# Patient Record
Sex: FEMALE | Race: WHITE | Employment: UNEMPLOYED | URBAN - METROPOLITAN AREA
[De-identification: names, ages, dates, MRNs, and addresses within clinical notes are randomized per-mention and may not be internally consistent; named-entity substitution may affect disease eponyms.]

---

## 2017-03-02 ENCOUNTER — OFFICE VISIT (OUTPATIENT)
Dept: INTERNAL MEDICINE CLINIC | Facility: CLINIC | Age: 47
End: 2017-03-02

## 2017-03-02 VITALS
BODY MASS INDEX: 33.49 KG/M2 | SYSTOLIC BLOOD PRESSURE: 144 MMHG | HEIGHT: 68 IN | DIASTOLIC BLOOD PRESSURE: 88 MMHG | RESPIRATION RATE: 16 BRPM | HEART RATE: 80 BPM | WEIGHT: 221 LBS

## 2017-03-02 DIAGNOSIS — E66.9 OBESITY, CLASS II, BMI 35-39.9: ICD-10-CM

## 2017-03-02 DIAGNOSIS — Z51.81 ENCOUNTER FOR THERAPEUTIC DRUG MONITORING: Primary | ICD-10-CM

## 2017-03-02 DIAGNOSIS — R73.03 PREDIABETES: ICD-10-CM

## 2017-03-02 PROCEDURE — 99213 OFFICE O/P EST LOW 20 MIN: CPT | Performed by: NURSE PRACTITIONER

## 2017-03-02 RX ORDER — TOPIRAMATE 50 MG/1
50 TABLET, FILM COATED ORAL 2 TIMES DAILY
COMMUNITY
End: 2017-06-02

## 2017-03-02 RX ORDER — PHENTERMINE HYDROCHLORIDE 15 MG/1
15 CAPSULE ORAL EVERY MORNING
Qty: 30 CAPSULE | Refills: 2 | Status: SHIPPED | OUTPATIENT
Start: 2017-03-02 | End: 2017-05-31

## 2017-03-02 RX ORDER — TOPIRAMATE 50 MG/1
50 TABLET, FILM COATED ORAL 2 TIMES DAILY
Qty: 60 TABLET | Refills: 2 | Status: SHIPPED | OUTPATIENT
Start: 2017-03-02 | End: 2017-05-31

## 2017-03-02 RX ORDER — PHENTERMINE HYDROCHLORIDE 15 MG/1
15 CAPSULE ORAL EVERY MORNING
COMMUNITY
End: 2017-06-02

## 2017-03-02 NOTE — PROGRESS NOTES
CC: Patient presents with:  Weight Check: lost 1 pounds       HPI:   Obesity. Patient doing well on phentermine, topiramate, belviq and metformin with good appetite control.  She feels she has lost more inches than on scale as went shopping for MOWGLI fasciitis     Depressive disorder, not elsewhere classified     Anxiety state, unspecified     Pneumonia     Encounter for therapeutic drug monitoring     Obesity, Class II, BMI 35-39.9     Prediabetes        REVIEW OF SYSTEMS:   RESPIRATORY: ebenezer gutierrez patient indicates understanding of these issues and agrees to the plan. Return in about 3 months (around 6/2/2017).

## 2017-04-10 ENCOUNTER — PRIOR ORIGINAL RECORDS (OUTPATIENT)
Dept: OTHER | Age: 47
End: 2017-04-10

## 2017-05-11 ENCOUNTER — PRIOR ORIGINAL RECORDS (OUTPATIENT)
Dept: OTHER | Age: 47
End: 2017-05-11

## 2017-06-02 ENCOUNTER — OFFICE VISIT (OUTPATIENT)
Dept: INTERNAL MEDICINE CLINIC | Facility: CLINIC | Age: 47
End: 2017-06-02

## 2017-06-02 VITALS
HEART RATE: 88 BPM | RESPIRATION RATE: 16 BRPM | SYSTOLIC BLOOD PRESSURE: 120 MMHG | DIASTOLIC BLOOD PRESSURE: 80 MMHG | HEIGHT: 68 IN | BODY MASS INDEX: 33.04 KG/M2 | WEIGHT: 218 LBS

## 2017-06-02 DIAGNOSIS — E66.9 OBESITY, CLASS II, BMI 35-39.9: ICD-10-CM

## 2017-06-02 DIAGNOSIS — Z51.81 ENCOUNTER FOR THERAPEUTIC DRUG MONITORING: Primary | ICD-10-CM

## 2017-06-02 PROCEDURE — 99213 OFFICE O/P EST LOW 20 MIN: CPT | Performed by: NURSE PRACTITIONER

## 2017-06-02 RX ORDER — PHENTERMINE HYDROCHLORIDE 15 MG/1
15 CAPSULE ORAL EVERY MORNING
Qty: 30 CAPSULE | Refills: 0 | Status: SHIPPED | OUTPATIENT
Start: 2017-06-02 | End: 2017-08-29

## 2017-06-02 RX ORDER — TOPIRAMATE 50 MG/1
50 TABLET, FILM COATED ORAL 2 TIMES DAILY
Qty: 60 TABLET | Refills: 0 | Status: SHIPPED | OUTPATIENT
Start: 2017-06-02 | End: 2017-08-29

## 2017-06-02 NOTE — PROGRESS NOTES
CC: Patient presents with:  Weight Check: lost 3 pounds       HPI:   Obesity. Patient tolerating all wlc meds and is continuing to eat  healthy and exercising and feels better overall.        Current Outpatient Prescriptions:  Phentermine HCl 15 MG Or monitoring     Obesity, Class II, BMI 35-39.9     Prediabetes        REVIEW OF SYSTEMS:   RESPIRATORY: denies shortness of breath   CARDIOVASCULAR: denies chest pain    EXAM:   /80 mmHg  Pulse 88  Resp 16  Ht 68\"  Wt 218 lb  BMI 33.15 kg/m2  GENERAL

## 2017-06-02 NOTE — PATIENT INSTRUCTIONS
Weigh weekly and if increase of 5 lbs total make earlier appointment. More Tips for Healthy Eating Out     Balance out your calories. If you're going out for dinner at a nice restaurant, make healthier choices during the day.    Be creative when eating

## 2017-06-16 ENCOUNTER — TELEPHONE (OUTPATIENT)
Dept: INTERNAL MEDICINE CLINIC | Facility: CLINIC | Age: 47
End: 2017-06-16

## 2017-06-16 NOTE — TELEPHONE ENCOUNTER
Message rec'd   Told patient to look on paperwork or to verify with pharmacy. She stated that she was missing the 2nd and 3rd month script bc she is now on a 3 month plan.  Pt told to call us back if there is a problem   Closing encounter

## 2017-08-24 ENCOUNTER — PRIOR ORIGINAL RECORDS (OUTPATIENT)
Dept: OTHER | Age: 47
End: 2017-08-24

## 2017-08-29 ENCOUNTER — OFFICE VISIT (OUTPATIENT)
Dept: INTERNAL MEDICINE CLINIC | Facility: CLINIC | Age: 47
End: 2017-08-29

## 2017-08-29 VITALS
DIASTOLIC BLOOD PRESSURE: 80 MMHG | HEART RATE: 80 BPM | HEIGHT: 68 IN | BODY MASS INDEX: 32.28 KG/M2 | SYSTOLIC BLOOD PRESSURE: 120 MMHG | WEIGHT: 213 LBS | RESPIRATION RATE: 16 BRPM

## 2017-08-29 DIAGNOSIS — Z51.81 ENCOUNTER FOR THERAPEUTIC DRUG MONITORING: ICD-10-CM

## 2017-08-29 DIAGNOSIS — E66.9 OBESITY, CLASS II, BMI 35-39.9: ICD-10-CM

## 2017-08-29 PROCEDURE — 99213 OFFICE O/P EST LOW 20 MIN: CPT | Performed by: NURSE PRACTITIONER

## 2017-08-29 RX ORDER — TOPIRAMATE 50 MG/1
50 TABLET, FILM COATED ORAL 2 TIMES DAILY
Qty: 60 TABLET | Refills: 2 | Status: SHIPPED | OUTPATIENT
Start: 2017-08-29 | End: 2017-12-06

## 2017-08-29 RX ORDER — PHENTERMINE HYDROCHLORIDE 15 MG/1
15 CAPSULE ORAL EVERY MORNING
Qty: 30 CAPSULE | Refills: 2 | Status: SHIPPED | OUTPATIENT
Start: 2017-08-29 | End: 2017-12-06

## 2017-08-29 NOTE — PROGRESS NOTES
CC: Patient presents with:  Weight Check: lost 5 pounds       HPI:   Obesity. Patient tolerating all wlc meds and is continuing to eat  healthy and exercising and feels better overall.  She just had bilateral laser done on legs with Dr. Arlette Maravilla and is Depressive disorder, not elsewhere classified     Anxiety state, unspecified     Pneumonia     Encounter for therapeutic drug monitoring     Obesity, Class II, BMI 35-39.9     Prediabetes        REVIEW OF SYSTEMS:   RESPIRATORY: denies shortness of breath

## 2017-08-30 ENCOUNTER — PRIOR ORIGINAL RECORDS (OUTPATIENT)
Dept: OTHER | Age: 47
End: 2017-08-30

## 2017-09-07 ENCOUNTER — PRIOR ORIGINAL RECORDS (OUTPATIENT)
Dept: OTHER | Age: 47
End: 2017-09-07

## 2017-09-14 ENCOUNTER — PRIOR ORIGINAL RECORDS (OUTPATIENT)
Dept: OTHER | Age: 47
End: 2017-09-14

## 2017-11-14 ENCOUNTER — PRIOR ORIGINAL RECORDS (OUTPATIENT)
Dept: OTHER | Age: 47
End: 2017-11-14

## 2017-12-06 ENCOUNTER — OFFICE VISIT (OUTPATIENT)
Dept: INTERNAL MEDICINE CLINIC | Facility: CLINIC | Age: 47
End: 2017-12-06

## 2017-12-06 VITALS
DIASTOLIC BLOOD PRESSURE: 82 MMHG | WEIGHT: 207.81 LBS | SYSTOLIC BLOOD PRESSURE: 112 MMHG | BODY MASS INDEX: 32 KG/M2 | RESPIRATION RATE: 16 BRPM

## 2017-12-06 DIAGNOSIS — E66.9 OBESITY, CLASS II, BMI 35-39.9: ICD-10-CM

## 2017-12-06 DIAGNOSIS — Z51.81 ENCOUNTER FOR THERAPEUTIC DRUG MONITORING: ICD-10-CM

## 2017-12-06 PROCEDURE — 99213 OFFICE O/P EST LOW 20 MIN: CPT | Performed by: NURSE PRACTITIONER

## 2017-12-06 RX ORDER — PHENTERMINE HYDROCHLORIDE 15 MG/1
15 CAPSULE ORAL EVERY MORNING
Qty: 30 CAPSULE | Refills: 2 | Status: SHIPPED | OUTPATIENT
Start: 2017-12-06 | End: 2018-04-06 | Stop reason: DRUGHIGH

## 2017-12-06 RX ORDER — TOPIRAMATE 50 MG/1
50 TABLET, FILM COATED ORAL 2 TIMES DAILY
Qty: 60 TABLET | Refills: 2 | Status: SHIPPED | OUTPATIENT
Start: 2017-12-06 | End: 2018-04-06

## 2017-12-06 NOTE — PATIENT INSTRUCTIONS
Keep up the great work with healthy eating and exercise. Diabetes: Learning About Serving and Portion Sizes     A good rule of thumb: Devote half your plate to vegetables and green salad.  Split the other half between protein and starchy carbohydrates When you’re planning for a snack or a meal, keep servings in mind. If you don’t have measuring cups or a scale handy, there are ways to [facility] serving sizes, such as comparing your food to the size of your hand (see pictures above).   Managing portion si

## 2017-12-06 NOTE — PROGRESS NOTES
CC: Patient presents with:  Weight Check: 3 month f/u - down 6 lbs from last visit in August       HPI:   Obesity.  Patient tolerating all wlc meds and is continuing to eat  healthy and exercising more and still has dog walking service and works with Problem List:     Annual physical exam     Plantar fasciitis     Depressive disorder, not elsewhere classified     Anxiety state, unspecified     Pneumonia     Encounter for therapeutic drug monitoring     Obesity, Class II, BMI 35-39.9     Prediabetes agrees to the plan. Return in about 3 months (around 3/6/2018).

## 2018-02-20 ENCOUNTER — PRIOR ORIGINAL RECORDS (OUTPATIENT)
Dept: OTHER | Age: 48
End: 2018-02-20

## 2018-02-21 ENCOUNTER — PRIOR ORIGINAL RECORDS (OUTPATIENT)
Dept: OTHER | Age: 48
End: 2018-02-21

## 2018-03-07 ENCOUNTER — MYAURORA ACCOUNT LINK (OUTPATIENT)
Dept: OTHER | Age: 48
End: 2018-03-07

## 2018-10-03 PROCEDURE — 88175 CYTOPATH C/V AUTO FLUID REDO: CPT | Performed by: INTERNAL MEDICINE

## 2018-10-03 PROCEDURE — 87624 HPV HI-RISK TYP POOLED RSLT: CPT | Performed by: INTERNAL MEDICINE

## 2019-02-28 VITALS
HEART RATE: 92 BPM | RESPIRATION RATE: 16 BRPM | SYSTOLIC BLOOD PRESSURE: 138 MMHG | BODY MASS INDEX: 31.98 KG/M2 | HEIGHT: 68 IN | DIASTOLIC BLOOD PRESSURE: 80 MMHG | WEIGHT: 211 LBS

## 2019-03-01 VITALS
BODY MASS INDEX: 32.74 KG/M2 | HEART RATE: 84 BPM | WEIGHT: 216 LBS | HEIGHT: 68 IN | SYSTOLIC BLOOD PRESSURE: 140 MMHG | RESPIRATION RATE: 16 BRPM | DIASTOLIC BLOOD PRESSURE: 92 MMHG

## 2019-03-01 VITALS
WEIGHT: 221 LBS | DIASTOLIC BLOOD PRESSURE: 78 MMHG | HEIGHT: 68 IN | HEART RATE: 76 BPM | RESPIRATION RATE: 16 BRPM | BODY MASS INDEX: 33.49 KG/M2 | SYSTOLIC BLOOD PRESSURE: 126 MMHG

## 2020-02-10 ENCOUNTER — APPOINTMENT (OUTPATIENT)
Dept: CT IMAGING | Facility: HOSPITAL | Age: 50
End: 2020-02-10
Attending: EMERGENCY MEDICINE
Payer: COMMERCIAL

## 2020-02-10 ENCOUNTER — HOSPITAL ENCOUNTER (EMERGENCY)
Facility: HOSPITAL | Age: 50
Discharge: HOME | End: 2020-02-11
Attending: EMERGENCY MEDICINE
Payer: COMMERCIAL

## 2020-02-10 DIAGNOSIS — S32.009A CLOSED FRACTURE OF TRANSVERSE PROCESS OF LUMBAR VERTEBRA, INITIAL ENCOUNTER (HCC): Primary | ICD-10-CM

## 2020-02-10 PROCEDURE — 72131 CT LUMBAR SPINE W/O DYE: CPT | Performed by: EMERGENCY MEDICINE

## 2020-02-10 PROCEDURE — 72125 CT NECK SPINE W/O DYE: CPT | Performed by: EMERGENCY MEDICINE

## 2020-02-10 PROCEDURE — 99284 EMERGENCY DEPT VISIT MOD MDM: CPT

## 2020-02-10 PROCEDURE — 99285 EMERGENCY DEPT VISIT HI MDM: CPT

## 2020-02-10 RX ORDER — DEXTROAMPHETAMINE SACCHARATE, AMPHETAMINE ASPARTATE MONOHYDRATE, DEXTROAMPHETAMINE SULFATE AND AMPHETAMINE SULFATE 5; 5; 5; 5 MG/1; MG/1; MG/1; MG/1
20 CAPSULE, EXTENDED RELEASE ORAL EVERY MORNING
COMMUNITY

## 2020-02-11 ENCOUNTER — APPOINTMENT (OUTPATIENT)
Dept: GENERAL RADIOLOGY | Facility: HOSPITAL | Age: 50
End: 2020-02-11
Attending: EMERGENCY MEDICINE
Payer: COMMERCIAL

## 2020-02-11 VITALS
WEIGHT: 240 LBS | RESPIRATION RATE: 18 BRPM | HEART RATE: 77 BPM | SYSTOLIC BLOOD PRESSURE: 117 MMHG | DIASTOLIC BLOOD PRESSURE: 70 MMHG | OXYGEN SATURATION: 99 % | HEIGHT: 68 IN | TEMPERATURE: 98 F | BODY MASS INDEX: 36.37 KG/M2

## 2020-02-11 PROCEDURE — 72170 X-RAY EXAM OF PELVIS: CPT | Performed by: EMERGENCY MEDICINE

## 2020-02-11 RX ORDER — CYCLOBENZAPRINE HCL 10 MG
10 TABLET ORAL 3 TIMES DAILY PRN
Qty: 20 TABLET | Refills: 0 | Status: SHIPPED | OUTPATIENT
Start: 2020-02-11 | End: 2020-02-17

## 2020-02-11 RX ORDER — IBUPROFEN 600 MG/1
600 TABLET ORAL ONCE
Status: COMPLETED | OUTPATIENT
Start: 2020-02-11 | End: 2020-02-11

## 2020-02-11 NOTE — ED PROVIDER NOTES
Patient Seen in: [facility] Emergency Department      History   Patient presents with:  Trauma  Back Pain    Stated Complaint: thrown from horse tonight, back pain    HPI    Patient is a 51-year-old female comes in emergency room for evaluation of n apparent distress, nontoxic, well-appearing. HEENT: Normocephalic, atraumatic. Moist mucous membranes. Pupils equal round reactive to light accommodation, extraocular motion is intact, sclerae white, conjunctiva is pink.   Oropharynx is unremarkable, no patient, I determined, within reasonable clinical confidence and prior to discharge, that an emergency medical condition was not or was no longer present. There was no indication for further evaluation, treatment or admission on an emergency basis.   Compr

## 2020-02-11 NOTE — ED INITIAL ASSESSMENT (HPI)
Patient here with pain to head, neck, low back, left hip/groin after being thrown from a horse during riding lessons about 1800 tonight. Patient denies any LOC but states she did hit her head. Patient states she landed on a sand/dirt arena.   Patient timoshimon

## 2020-02-12 ENCOUNTER — TELEPHONE (OUTPATIENT)
Dept: SURGERY | Facility: CLINIC | Age: 50
End: 2020-02-12

## 2020-02-12 NOTE — TELEPHONE ENCOUNTER
NP, ref by THE [facility] ER 2/10/20, Fx Back-imaging done THE [facility]. Was referred to Dr Feliz Mar.  Please advise when & where to put fx back

## 2020-02-17 ENCOUNTER — OFFICE VISIT (OUTPATIENT)
Dept: SURGERY | Facility: CLINIC | Age: 50
End: 2020-02-17
Payer: COMMERCIAL

## 2020-02-17 VITALS
SYSTOLIC BLOOD PRESSURE: 120 MMHG | DIASTOLIC BLOOD PRESSURE: 80 MMHG | BODY MASS INDEX: 36.37 KG/M2 | HEART RATE: 70 BPM | HEIGHT: 68 IN | WEIGHT: 240 LBS

## 2020-02-17 DIAGNOSIS — S32.009D CLOSED FRACTURE OF TRANSVERSE PROCESS OF LUMBAR VERTEBRA WITH ROUTINE HEALING, SUBSEQUENT ENCOUNTER: Primary | ICD-10-CM

## 2020-02-17 PROCEDURE — 99204 OFFICE O/P NEW MOD 45 MIN: CPT | Performed by: PHYSICIAN ASSISTANT

## 2020-02-17 RX ORDER — CYCLOBENZAPRINE HCL 10 MG
10 TABLET ORAL 3 TIMES DAILY PRN
Qty: 90 TABLET | Refills: 1 | Status: SHIPPED | OUTPATIENT
Start: 2020-02-17 | End: 2021-03-10

## 2020-02-17 RX ORDER — PROPRANOLOL HYDROCHLORIDE 20 MG/1
1 TABLET ORAL DAILY
COMMUNITY
Start: 2019-12-18

## 2020-02-17 NOTE — PROGRESS NOTES
[facility] Neurosurgery Consultation      HISTORY OF PRESENT Lexi Barba is a 52year old right-handed female here in follow-up from the emergency room. She gives a history of riding horses at least once a week.   She wrote a more aggressive cou • ANXIETY    • DEPRESSION     psychiatrist Dr. Aimee Haji HISTORY:  Past Surgical History:   Procedure Laterality Date   •      • GASTRIC BYPASS,OBESE<100CM MAURICE-EN-Y     • OTHER      left fibula with plate   • TONSILLECTOMY pubis    Upper extremity strength:       Deltoid    Triceps     Biceps        Wrist Extension  Finger extension Thumb Abduction  Thumb adduction Intrinsics   Right         5        5         5          5 5 5 5 5 5-   Left         5        5         5

## 2020-02-17 NOTE — PATIENT INSTRUCTIONS
Refill policies:    • Allow 2-3 business days for refills; controlled substances may take longer.   • Contact your pharmacy at least 5 days prior to running out of medication and have them send an electronic request or submit request through the “request re Depending on your insurance carrier, approval may take 3-10 days. It is highly recommended patients contact their insurance carrier directly to determine coverage.   If test is done without insurance authorization, patient may be responsible for the entire in 8 weeks with lumbar x-rays

## 2020-02-26 ENCOUNTER — PATIENT MESSAGE (OUTPATIENT)
Dept: SURGERY | Facility: CLINIC | Age: 50
End: 2020-02-26

## 2020-02-26 NOTE — TELEPHONE ENCOUNTER
From: Maxim Pro  To: PHILIPP Schreiber  Sent: 2/26/2020 11:23 AM CST  Subject: Visit Follow-up Question    Good morning,  It was nice meeting you last week.  Thanks for helping me understand exactly what is going on with my body in terms of my

## 2020-04-07 ENCOUNTER — PATIENT MESSAGE (OUTPATIENT)
Dept: SURGERY | Facility: CLINIC | Age: 50
End: 2020-04-07

## 2020-04-07 ENCOUNTER — TELEPHONE (OUTPATIENT)
Dept: SURGERY | Facility: CLINIC | Age: 50
End: 2020-04-07

## 2020-04-08 NOTE — TELEPHONE ENCOUNTER
MyChart message from patient noted. At [address] [place] [address] on 2/17/20 with PHILIPP Nichole:    \"ASSESSMENT:  1. Right cervical strain  2. Left transverse process fractures of L2-L3 and L4 February 10, 2020  3.   Left SI joint pain secondary to a strain  4.  Km [place]

## 2020-04-13 ENCOUNTER — VIRTUAL PHONE E/M (OUTPATIENT)
Dept: SURGERY | Facility: CLINIC | Age: 50
End: 2020-04-13
Payer: COMMERCIAL

## 2020-04-13 DIAGNOSIS — S32.009D CLOSED FRACTURE OF TRANSVERSE PROCESS OF LUMBAR VERTEBRA WITH ROUTINE HEALING, SUBSEQUENT ENCOUNTER: Primary | ICD-10-CM

## 2020-04-13 PROCEDURE — 99213 OFFICE O/P EST LOW 20 MIN: CPT | Performed by: PHYSICIAN ASSISTANT

## 2020-04-13 NOTE — PROGRESS NOTES
Virtual Telephone Check-In    Mahnaz French verbally consents to a Virtual/Telephone Check-In visit on 04/13/20. Patient understands and accepts financial responsibility for any deductible, co-insurance and/or co-pays associated with this service. the hands or weakness in the hands. She denies any thoracic pain. She has left-sided lower back pain and left buttock pain. She denies any pain down the legs. When she tries to lift her left leg she gets increasing left-sided lower back pain.   She haseeb extension. With rotation she gets some tightness in the right side of her upper trap. She is tender over the right occiput and the superior border of the right trapezius muscle. She has no pain over the spinous processes of the cervical spine.  sensation spine at next apt  3. Follow-up in 6-8 weeks with lumbar x-rays  4. Over-the-counter Tylenol and NSAIDs for comfort. 5. Soft C vandana JEAN.  Gerald Mcdonald M.S., YASMIN BARROSO [facility]  [address] [facility], [address]

## 2021-04-12 ENCOUNTER — IMMUNIZATION (OUTPATIENT)
Dept: LAB | Age: 51
End: 2021-04-12
Attending: HOSPITALIST
Payer: COMMERCIAL

## 2021-04-12 DIAGNOSIS — Z23 NEED FOR VACCINATION: Primary | ICD-10-CM

## 2021-04-12 PROCEDURE — 0001A SARSCOV2 VAC 30MCG/0.3ML IM: CPT

## 2021-05-03 ENCOUNTER — IMMUNIZATION (OUTPATIENT)
Dept: LAB | Age: 51
End: 2021-05-03
Attending: HOSPITALIST
Payer: COMMERCIAL

## 2021-05-03 DIAGNOSIS — Z23 NEED FOR VACCINATION: Primary | ICD-10-CM

## 2021-05-03 PROCEDURE — 0002A SARSCOV2 VAC 30MCG/0.3ML IM: CPT

## 2021-11-13 NOTE — TELEPHONE ENCOUNTER
Called and left VM  Sent [facility] message  convert 4/13 apt to e visit
No

## 2022-02-17 NOTE — TELEPHONE ENCOUNTER
[facility] at [address] [place]  Supervised Weight Loss     Date:   2022    Patient's Name: Cindy Dennis  : 1954    Insurance:  Medicare/[employer]          Session:   Surgery: Sleeve gastrectomy  Surgeon:  Dr. Madhav Ferguson    Height: 65 inches Weight:   302 lbs    Lbs. BMI: 50   Pounds Lost since last month: 9 lbs              Pounds Gained since last month: 0    Starting Weight: 311 lbs   Previous Months Weight: 311 lbs  Overall Pounds Lost: 9 lbs Overall Pounds Gained: 0    Other Pertinent Information: Today's appointment was completed in a virtual setting d/t COVID-19. Smoking Status: None  Alcohol Intake: None    I have reviewed with pt the guidelines of the supervised wt loss program.  Pt understands the expectations of some wt loss during the program and that wt gain could delay the process. I have also explained that appointments need to be consecutive and missing an appointment may result in starting over. Pt has received this information in writing. Changes that patient has made since last month include: tried protein shake, less sugar sweetened tea; purchased scale; exercising consistently. Eating Habits and Behaviors  General healthy eating guidelines were also discussed. Pts were instructed that their plate should be made up 1/2 plate coming from non-starchy vegetables, 1/4 coming from lean meat, and 1/4 of their plate coming from carbohydrates, including fruits, starches, or milk. We discussed measuring meals to 1/2 cup total per meal after surgery. Drinking only calorie-free, sugar-free and non-carbonated beverages. We discussed the importance of drinking 64 ounces of fluid per day to prevent dehydration post-operatively. Physical Activity/Exercise  We talked about the importance of increasing daily physical activity and beginning to develop an exercise regimen/routine.  We talked about exercise as being an important Phentermine script printed and given to patient in office. Please advise patient to check paperwork? part of long term weight loss after surgery. Comments:  During class, I discussed with patient the importance of getting into an exercise routine. Pt is currently doing chair exercises 3x week and leg lifts for activity. Pt has been encouraged to increase frequency of exercise. Behavior Modification    A behavior modification lesson was provided with an emphasis on developing mindful eating behaviors. We talked about how to eat more mindfully and identify emotional eating triggers. Tips and recommendations for how to make these changes were provided. Pt was encouraged to keep a food journal and record what they were taking in daily. Patient's reported eating behaviors: chewing food well, taking time at meals; eats meals in front of TV        Overall Assessment: Nutrition evaluation reveals small lifestyle changes being made, along with wt loss. Goals set and recommendations made. Will continue to assess    Patient-Set Goals:   1. Nutrition - getting 3 meals in a day- protein at each meal; eliminate sugar sweetened beverages (tea)  2. Exercise - continue with current exercise  3.  Behavior - eat meals at the table    Jeff Cortes RD  2/17/2022

## (undated) NOTE — MR AVS SNAPSHOT
[address] [phone]               Thank you for choosing us for your health care visit with Tessa Krabbe, APN.   We are glad to serve you and happy to provide you with this summary · Wear the right safety gear and shoes for your activity. · Drink plenty of water during and after workouts. · Wear light-colored clothing if you’re out when it’s dark. · Make time to warm up before you exercise and cool down after.   · Carry ID (identif Commonly known as:  BELVIQ XR           MetFORMIN HCl 500 MG Tabs   Take 1 tablet (500 mg total) by mouth 2 (two) times daily with meals.    Commonly known as:  GLUCOPHAGE           Phentermine HCl 15 MG Caps   Take 1 capsule (15 mg total) by mouth every mo Please consider the following Lifestyle Modifications. Also, please return for a follow-up Blood Pressure Check in 1 month.      Lifestyle Modification Recommendations:    Modification Recommendation   Weight Reduction Maintain normal body weight (body mas

## (undated) NOTE — ED AVS SNAPSHOT
Georgia Arango   MRN: [id number]    Department:  [facility] Emergency Department   Date of Visit:  2/10/2020           Disclosure     Insurance plans vary and the physician(s) referred by the ER may not be covered by your plan.  Please contact tell this physician (or your personal doctor if your instructions are to return to your personal doctor) about any new or lasting problems. The primary care or specialist physician will see patients referred from the [facility] Emergency Department.  Shelton Lombard

## (undated) NOTE — MR AVS SNAPSHOT
[address] [phone]               Thank you for choosing us for your health care visit with CELSO Turner.   We are glad to serve you and happy to provide you with this summary · Ask for vegetables and main courses to be served without sauces, butter, margarine, or oil. · Be aware of portion size. You don’t have to clean your plate. Take half your meal home in a doggie bag to eat the next day.   · Look for heart-healthy or low-fa Take 1 capsule (15 mg total) by mouth every morning. SUBOXONE 2-0.5 MG Film   Generic drug:  buprenorphine HCl-Naloxone HCl   Place  under the tongue 2 (two) times daily.            SUPER B COMPLEX OR   1 tablet daily           THIAMINE HCL OR   T Don’t eat while when you’re bored.      EAT THESE FOODS MORE OFTEN: EAT THESE FOODS LESS OFTEN:   Make half your plate fruits and vegetables Highly refined, white starches including white bread, rice and pasta   Eat plenty of protein, keep the fat content l